# Patient Record
Sex: MALE | Race: WHITE | Employment: FULL TIME | URBAN - METROPOLITAN AREA
[De-identification: names, ages, dates, MRNs, and addresses within clinical notes are randomized per-mention and may not be internally consistent; named-entity substitution may affect disease eponyms.]

---

## 2018-01-03 ENCOUNTER — OFFICE VISIT (OUTPATIENT)
Dept: NEUROLOGY | Age: 63
End: 2018-01-03

## 2018-01-03 DIAGNOSIS — M54.12 CERVICAL RADICULOPATHY: Primary | ICD-10-CM

## 2018-01-03 DIAGNOSIS — G56.10 MEDIAN NERVE NEUROPATHY, UNSPECIFIED LATERALITY: ICD-10-CM

## 2018-01-03 NOTE — PROGRESS NOTES
EMG/ NCS Report  hospitals, [address]  Ph: [phone]/ [phone]  FAX: [phone]/ [phone]  Test Date:  1/3/2018    Patient: Timmy Oviedo : 1955 Physician: Jessica France MD   Sex: Male Height: ' \" Ref Phys: Ad    ID#: [id number] Weight:  lbs. Technician: Shyanne Hugo     Patient History / Exam:  CC:CIELO UPPERS,4TH AND 5 TH DIGITS DRAWING,SHOULDER PAIN AND MUSLCE SPASM. EMG & NCV Findings:  Evaluation of the left median motor and the right median motor nerves showed normal distal onset latency (L3.8, R3.8 ms), normal amplitude (L10.9, R8.4 mV), and normal conduction velocity (Elbow-Wrist, L52, R55 m/s). The left ulnar motor nerve showed normal distal onset latency (3.0 ms), reduced amplitude (6.2 mV), decreased conduction velocity (B Elbow-Wrist, 44 m/s), and normal conduction velocity (A Elbow-B Elbow, 71 m/s). The right ulnar motor nerve showed normal distal onset latency (2.8 ms), normal amplitude (7.0 mV), normal conduction velocity (B Elbow-Wrist, 58 m/s), and normal conduction velocity (A Elbow-B Elbow, 63 m/s). The left median sensory, the left radial sensory, and the left ulnar sensory nerves showed normal distal peak latency (L3.3, L2.0, L3.6 ms) and normal amplitude (L21.2, L30.9, L9.0 µV). The right median sensory and the right ulnar sensory nerves showed normal distal peak latency (R3.1, R2.9 ms) and reduced amplitude (R8.5, R8.3 µV). The right radial sensory nerve showed no response (Wrist), no response (Site 2), and no response (Site 3). Left vs. Right side comparison data for the ulnar sensory nerve indicates abnormal L-R latency difference (0.7 ms). All remaining left vs. right side differences were within normal limits. All F Wave latencies were within normal limits. All F Wave left vs. right side latency differences were within normal limits.       Needle evaluation of the right flexor carpi ulnaris, the right triceps, the right biceps, the right mid cervical paraspinal, the right Lower Cerv Parasp, the left mid cervical paraspinal, the left Lower Cerv Parasp, the left first dorsal interosseous, and the left triceps muscles showed increased insertional activity. All remaining muscles (as indicated in the following table) showed no evidence of electrical instability.         Impression:        ___________________________  Ivonne Espinoza IV, MD      Nerve Conduction Studies  Anti Sensory Summary Table     Stim Site NR Peak (ms) Norm Peak (ms) P-T Amp (µV) Norm P-T Amp Site1 Site2 Dist (cm)   Left Median Anti Sensory (2nd Digit)  31.4°C   Wrist    3.3 <4 21.2 >13 Wrist 2nd Digit 14.0   Elbow    3.3  22.1  Elbow Wrist 0.0   Right Median Anti Sensory (2nd Digit)  30.8°C   Wrist    3.1 <4 8.5 >13 Wrist 2nd Digit 14.0   Elbow    3.1  6.0  Elbow Wrist 0.0   Axilla    3.1  7.3  Axilla Elbow 0.0   Left Radial Anti Sensory (Base 1st Digit)  32.7°C   Wrist    2.0 <2.8 30.9 >11 Wrist Base 1st Digit 10.0   Site 2    2.0  26.6       Right Radial Anti Sensory (Base 1st Digit)  31.9°C   Wrist NR  <2.8  >11 Wrist Base 1st Digit 10.0   Site 2 NR          Site 3 NR          Left Ulnar Anti Sensory (5th Digit)  31.9°C   Wrist    3.6 <4.0 9.0 >9 Wrist 5th Digit 14.0   B Elbow    3.5  5.1  B Elbow Wrist 0.0   A Elbow    3.3  7.2  A Elbow B Elbow 0.0   Right Ulnar Anti Sensory (5th Digit)  31.5°C   Wrist    2.9 <4.0 8.3 >9 Wrist 5th Digit 14.0   B Elbow    3.0  5.9  B Elbow Wrist 0.0     Motor Summary Table     Stim Site NR Onset (ms) Norm Onset (ms) O-P Amp (mV) Norm O-P Amp Amp (Prev) (%) Site1 Site2 Dist (cm) Sandro (m/s) Norm Sandro (m/s)   Left Median Motor (Abd Poll Brev)  33°C   Wrist    3.8 <4.5 10.9 >4.1 100.0 Wrist Abd Poll Brev 8.0     Elbow    8.0  9.7  89.0 Elbow Wrist 22.0 52 >49   Right Median Motor (Abd Poll Brev)  32.2°C   Wrist    3.8 <4.5 8.4 >4.1 100.0 Wrist Abd Poll Brev 8.0     Elbow    8.0  7.3  86.9 Elbow Wrist 23.0 55 >49   Left Ulnar Motor (Abd Dig Minimi)  31.3°C   Wrist    3.0 <3.1 6.2 >7.0 100.0 Wrist Abd Dig Minimi 8.0  >50   B Elbow    8.2  2.6  41.9 B Elbow Wrist 23.0 44 >50   A Elbow    9.6  2.5  96.2 A Elbow B Elbow 10.0 71 >50   Right Ulnar Motor (Abd Dig Minimi)  32.6°C   Wrist    2.8 <3.1 7.0 >7.0 100.0 Wrist Abd Dig Minimi 8.0  >50   B Elbow    6.6  7.0  100.0 B Elbow Wrist 22.0 58 >50   A Elbow    8.2  6.9  98.6 A Elbow B Elbow 10.0 63 >50     F Wave Studies     NR F-Lat (ms) Lat Norm (ms) L-R F-Lat (ms) L-R Lat Norm   Left Ulnar (Mrkrs) (Abd Dig Min)  31.1°C      27.92 <32 0.71 <2.5   Right Ulnar (Mrkrs) (Abd Dig Min)  32.7°C      27.21 <32 0.71 <2.5     EMG     Side Muscle Nerve Root Ins Act Fibs Psw Recrt Duration Amp Poly Comment   Right 1stDorInt Ulnar C8-T1 Nml Nml Nml Nml Nml Nml Nml    Right FlexCarpiUln Ulnar C8,T1 Incr Nml Nml Nml Nml Nml Nml    Right BrachioRad Radial C5-6 Nml Nml Nml Nml Nml Nml Nml    Right Triceps Radial C6-7-8 Incr Nml Nml Nml Nml Nml Nml    Right Biceps Musculocut C5-6 Incr Nml Nml Nml Nml Nml Nml    Right Deltoid Axillary C5-6 Nml Nml Nml Nml Nml Nml Nml    Right Mid Cerv Parasp Rami C5,6 Incr Nml Nml Nml Nml Nml Nml    Right Lower Cerv Parasp Rami C7,T1 Incr Nml Nml Nml Nml Nml Nml    Left Mid Cerv Parasp Rami C5,6 Incr Nml Nml Nml Nml Nml Nml    Left Lower Cerv Parasp Rami C7,T1 Incr Nml Nml Nml Nml Nml Nml    Left 1stDorInt Ulnar C8-T1 Incr Nml Nml Nml Nml Nml Nml    Left FlexCarpiUln Ulnar C8,T1 Nml Nml Nml Nml Nml Nml Nml    Left BrachioRad Radial C5-6 Nml Nml Nml Nml Nml Nml Nml    Left Triceps Radial C6-7-8 Incr Nml Nml Nml Nml Nml Nml    Left Deltoid Axillary C5-6 Nml Nml Nml Nml Nml Nml Nml                Nerve Conduction Studies  Anti Sensory Left/Right Comparison     Stim Site L Lat (ms) R Lat (ms) L-R Lat (ms) L Amp (µV) R Amp (µV) L-R Amp (%) Site1 Site2 L Sandro (m/s) R Sandro (m/s) L-R Sandro (m/s)   Median Anti Sensory (2nd Digit)  31.4°C   Wrist 2.5 2.4 0.1 21.2 8.5 59.9 Wrist 2nd Digit 56 58 2   Elbow 2.8 2.6 0.2 22.1 6.0 72.9 Elbow Wrist      Radial Anti Sensory (Base 1st Digit)  32.7°C   Wrist 1.6   30.9   Wrist Base 1st Digit 63     Site 2 1.5   26.6          Ulnar Anti Sensory (5th Digit)  31.9°C   Wrist 2.8 2.1 0.7 9.0 8.3 7.8 Wrist 5th Digit 50 67 17   B Elbow 2.9 2.4 0.5 5.1 5.9 13.6 B Elbow Wrist      A Elbow 2.9   7.2   A Elbow B Elbow        Motor Left/Right Comparison     Stim Site L Lat (ms) R Lat (ms) L-R Lat (ms) L Amp (mV) R Amp (mV) L-R Amp (%) Site1 Site2 L Sandro (m/s) R Sandro (m/s) L-R Sandro (m/s)   Median Motor (Abd Poll Brev)  33°C   Wrist 3.8 3.8 0.0 10.9 8.4 22.9 Wrist Abd Poll Brev      Elbow 8.0 8.0 0.0 9.7 7.3 24.7 Elbow Wrist 52 55 3   Ulnar Motor (Abd Dig Minimi)  31.3°C   Wrist 3.0 2.8 0.2 6.2 7.0 11.4 Wrist Abd Dig Minimi      B Elbow 8.2 6.6 1.6 2.6 7.0 62.9 B Elbow Wrist 44 58 14   A Elbow 9.6 8.2 1.4 2.5 6.9 63.8 A Elbow B Elbow 71 63 8         Waveforms:

## 2018-01-03 NOTE — PROGRESS NOTES
EMG and nerve conduction of both upper extremities based on patient's complaint of right greater than left-sided weakness and curtailed function of the hands etc.  Previous history of neck surgery and I believe carpal tunnel decompression is in the background as well and s/p L ulnar nerve transposition? Findings:    1. The EMG portion involved: Sampling T1 through C5 muscle groups of both upper extremities. It also involved evaluation of the associated cervical paraspinal levels as well. The insertional activity showed increased irritability in the right flexor carpi ulnaris and the biceps and less so in the triceps groups. No outright declaration of denervation such as fibs positive waves or fasciculations. The right paraspinal groups were also extremely irritable in the mid to lower cervical levels. Motor unit recruitment was good and did not demonstrate any abnormality as it would apply to the number configuration or intervals with such testing. The left side showed some irritability in the left first dorsal interosseous and the flexor carpi ulnaris but less impressive than on the right side. The associated left cervical paraspinals in the mid and lower levels showed irritability as well although again not as marked as on the right side. Again on the left side no outright capture of denervation was otherwise noteworthy. Patient tolerated the procedure well and cooperation was unquestionably excellent. 2.  The nerve conduction portion showed absent right radial sensory response and the left ulnar motor at the elbow showed a pathologic discrepancy in the above versus below nerve conduction comparison. Impression: This study is noteworthy for the previously implied isolated irritability in the right greater than left muscle groups including paraspinals of the C6-8 nerves. This is nonspecific but could indicate early radicular type features at this time. But no active denervation captured.   Also the patient has an implied right radial sensory neuropathy and pathologic discrepancy in the above and below conduction velocities of the left ulnar motor nerve- status post transposition. Clinical correlation is advised.   SARAH GARCIA.

## 2018-03-06 ENCOUNTER — HOSPITAL ENCOUNTER (OUTPATIENT)
Dept: MRI IMAGING | Age: 63
Discharge: HOME | End: 2018-03-06
Payer: COMMERCIAL

## 2018-03-06 DIAGNOSIS — M54.12 RADICULITIS, CERVICAL: ICD-10-CM

## 2018-03-06 PROCEDURE — 72141 MRI NECK SPINE W/O DYE: CPT

## 2019-04-22 ENCOUNTER — HOSPITAL ENCOUNTER (EMERGENCY)
Age: 64
Discharge: HOME | End: 2019-04-22
Attending: EMERGENCY MEDICINE
Payer: COMMERCIAL

## 2019-04-22 ENCOUNTER — APPOINTMENT (OUTPATIENT)
Dept: VASCULAR SURGERY | Age: 64
End: 2019-04-22
Attending: EMERGENCY MEDICINE
Payer: COMMERCIAL

## 2019-04-22 VITALS
SYSTOLIC BLOOD PRESSURE: 174 MMHG | WEIGHT: 165 LBS | BODY MASS INDEX: 25.9 KG/M2 | DIASTOLIC BLOOD PRESSURE: 79 MMHG | OXYGEN SATURATION: 97 % | TEMPERATURE: 98.3 F | HEIGHT: 67 IN | HEART RATE: 94 BPM | RESPIRATION RATE: 18 BRPM

## 2019-04-22 DIAGNOSIS — T14.8XXA MUSCLE STRAIN: Primary | ICD-10-CM

## 2019-04-22 PROCEDURE — 99283 EMERGENCY DEPT VISIT LOW MDM: CPT

## 2019-04-22 PROCEDURE — 93971 EXTREMITY STUDY: CPT

## 2019-04-22 PROCEDURE — 74011250637 HC RX REV CODE- 250/637: Performed by: EMERGENCY MEDICINE

## 2019-04-22 RX ORDER — IBUPROFEN 600 MG/1
600 TABLET ORAL
Qty: 30 TAB | Refills: 0 | Status: SHIPPED | OUTPATIENT
Start: 2019-04-22

## 2019-04-22 RX ORDER — IBUPROFEN 600 MG/1
600 TABLET ORAL
Status: COMPLETED | OUTPATIENT
Start: 2019-04-22 | End: 2019-04-22

## 2019-04-22 RX ADMIN — IBUPROFEN 600 MG: 600 TABLET, FILM COATED ORAL at 16:59

## 2019-04-22 NOTE — ED PROVIDER NOTES
58 y.o. male with past medical history significant for insomnia, PTSD, arthritis, and chronic back pain who presents from [facility] NP office with chief complaint of leg pain. Patient states that he has been experiencing lower right leg tenderness for the past 2 days. He reports that his pain worsened yesterday evening. He notes that his pain woke him up yesterday night and \"felt like pins. \"  Patient visited a [facility] NP earlier today for his symptoms and was advised to visit the ED for possible blood clot. Patient also contacted his PCP, Dr. Abebe Zhou, and was also advised to visit the ED to have an \"ultrasound\" performed. He arrives to the ED with his right lower leg wrapped. Of note, patient also complains of mild back pain and states that he has been doing yard work recently. He denies CP, SOB, numbness, tingling, recent surgeries or hospitalizations, and past h/o blood clots. There are no other acute medical concerns at this time. Social hx: negative tobacco use; positive alcohol use; negative drug use PCP: Dylon Merchant MD 
 
Note written by Naye Leyva, as dictated by Rosenda Luna MD 4:41 PM 
 
 
The history is provided by the patient. No  was used. Past Medical History:  
Diagnosis Date  Arthritis   
 osteo  Chronic back pain  Chronic pain   
 back  Insomnia  Psychiatric disorder   
 hx PTSD  PTSD (post-traumatic stress disorder) Past Surgical History:  
Procedure Laterality Date  HX BACK SURGERY  2015  HX KNEE ARTHROSCOPY Right X 3  
 HX ORTHOPAEDIC Left   
 ankle  HX ORTHOPAEDIC Right   
 arm  HX ORTHOPAEDIC Right 11/23/2016 Carpal tunnel release No family history on file. Social History Socioeconomic History  Marital status: SINGLE Spouse name: Not on file  Number of children: Not on file  Years of education: Not on file  Highest education level: Not on file Occupational History  Not on file Social Needs  Financial resource strain: Not on file  Food insecurity:  
  Worry: Not on file Inability: Not on file  Transportation needs:  
  Medical: Not on file Non-medical: Not on file Tobacco Use  Smoking status: Never Smoker  Smokeless tobacco: Never Used Substance and Sexual Activity  Alcohol use: Yes Alcohol/week: 8.4 oz Types: 7 Cans of beer, 7 Shots of liquor per week Comment: either 2 beers or 2 mixed drinks nightly  Drug use: No  
 Sexual activity: Never Lifestyle  Physical activity:  
  Days per week: Not on file Minutes per session: Not on file  Stress: Not on file Relationships  Social connections:  
  Talks on phone: Not on file Gets together: Not on file Attends Taoism service: Not on file Active member of club or organization: Not on file Attends meetings of clubs or organizations: Not on file Relationship status: Not on file  Intimate partner violence:  
  Fear of current or ex partner: Not on file Emotionally abused: Not on file Physically abused: Not on file Forced sexual activity: Not on file Other Topics Concern  Not on file Social History Narrative  Not on file ALLERGIES: Patient has no known allergies. Review of Systems Constitutional: Negative for chills and fever. HENT: Negative for ear pain and sore throat. Eyes: Negative for pain. Respiratory: Negative for chest tightness and shortness of breath. Cardiovascular: Negative for chest pain and leg swelling. Gastrointestinal: Negative for abdominal pain, nausea and vomiting. Genitourinary: Negative for dysuria and flank pain. Musculoskeletal: Positive for back pain (mild) and myalgias (right leg pain). Skin: Negative for rash. Neurological: Negative for headaches. All other systems reviewed and are negative. Vitals:  
 04/22/19 1643 BP: 174/79 Pulse: 94 Resp: 18 Temp: 98.3 °F (36.8 °C) SpO2: 97% Weight: 74.8 kg (165 lb) Height: 5' 7\" (1.702 m) Physical Exam  
Constitutional: No distress. HENT:  
Head: Normocephalic and atraumatic. Eyes: Pupils are equal, round, and reactive to light. Conjunctivae are normal. No scleral icterus. Neck: No tracheal deviation present. Cardiovascular: Normal rate and regular rhythm. Pulmonary/Chest: Effort normal and breath sounds normal. No stridor. No respiratory distress. Abdominal: Soft. He exhibits no distension. There is no tenderness. Musculoskeletal: He exhibits no edema or deformity. Right lower leg: He exhibits tenderness (tenderness over right shin). Neurological: He is alert. Skin: Skin is warm and dry. Psychiatric: He has a normal mood and affect. Nursing note and vitals reviewed. Note written by Naye Fierro, as dictated by Haylee Harvey MD 4:41 PM 
 
 
MDM Number of Diagnoses or Management Options Muscle strain:  
Diagnosis management comments: 59 yo male with right LE pain. - ultrasound neg - ibuprofen 
- f/u c pcp 
- given return precautions Procedures Sarahi Elliott.  Stuart Espinoza MD

## 2019-04-22 NOTE — ED TRIAGE NOTES
Pt presents with right lower leg tenderness, pt referred to rule out DVT. Pt with calf tenderness on Dr. Deloras Halsted assessment. Pt denies any chest pain or shortness of breath. Reports some back pain.

## 2019-04-22 NOTE — ED NOTES
Discharge instructions given to patient. Understanding verbalized. All questions answered. Discharged home with self.

## 2019-06-14 ENCOUNTER — OFFICE VISIT (OUTPATIENT)
Dept: NEUROLOGY | Age: 64
End: 2019-06-14

## 2019-06-14 VITALS
WEIGHT: 162.5 LBS | SYSTOLIC BLOOD PRESSURE: 130 MMHG | RESPIRATION RATE: 18 BRPM | TEMPERATURE: 98.2 F | OXYGEN SATURATION: 96 % | DIASTOLIC BLOOD PRESSURE: 88 MMHG | HEIGHT: 67 IN | BODY MASS INDEX: 25.51 KG/M2 | HEART RATE: 84 BPM

## 2019-06-14 DIAGNOSIS — M62.81 MUSCLE WEAKNESS: ICD-10-CM

## 2019-06-14 DIAGNOSIS — R20.8 SKIN PAIN: ICD-10-CM

## 2019-06-14 DIAGNOSIS — M79.604 RIGHT LEG PAIN: ICD-10-CM

## 2019-06-14 DIAGNOSIS — M79.604 RIGHT LEG PAIN: Primary | ICD-10-CM

## 2019-06-14 RX ORDER — LIDOCAINE 50 MG/G
3 PATCH TOPICAL EVERY 24 HOURS
Qty: 90 EACH | Refills: 3 | Status: SHIPPED | OUTPATIENT
Start: 2019-06-14

## 2019-06-14 RX ORDER — ACYCLOVIR 400 MG/1
400 TABLET ORAL 2 TIMES DAILY
COMMUNITY

## 2019-06-14 NOTE — PROGRESS NOTES
[facility] and [address] at [facility] at [address], [address] [place] [address] [address] [facility], [address] [place] [address]  [phone] Office  [phone] Facsimile           Referring: Jair Leon MD      Chief Complaint   Patient presents with    New Patient    Leg Pain     right over the past 8 wks, went to [facility] ED to r/o clot, then followed with pcp who referred to vascular (seen last Fri) no clot or vascular prop found.  having anything rubbing against leg or using leg to press accelerator is very painful      41-year-old right-handed man who comes today for evaluation of what he calls pain in his right leg. He says symptoms started about 8 weeks ago. He says that he has had ultrasounds time to looking for blood clots and has been fine. He says that now he has pain if he touches the right part of the shin. He actually describes from the shin down to the ankle hypersensitivity. He says that even his clothing touching it causes a remarkable degree of pain. He says that his boss has allowed him to wear shorts to work because his pants leg touching his skin caused him so much discomfort. He says that at work he is gotten to the point that he does not drink any coffee and he will eat anything because he does not want to get up and go to the restroom because walking causes him intense pain. It is from again the shin to the ankle on the right. The left is fine. No injury. He did note that last year he had the shingles vaccine and then he had a disseminated rash but nothing in that front of the leg. He is not had any rash recently there. No bites or other known etiology. He has not had any focal weakness. No chest pain. No fevers or chills.       Past Medical History:   Diagnosis Date    Arthritis     osteo    Chronic back pain     Chronic pain     back    Insomnia     Psychiatric disorder     hx PTSD    PTSD (post-traumatic stress disorder)        Past Surgical History:   Procedure Laterality Date    HX BACK SURGERY  2015    HX CATARACT REMOVAL Bilateral     HX KNEE ARTHROSCOPY Right     X 3    HX ORTHOPAEDIC Left     ankle    HX ORTHOPAEDIC Right     arm    HX ORTHOPAEDIC Right 11/23/2016    Carpal tunnel release       Current Outpatient Medications   Medication Sig Dispense Refill    acyclovir (ZOVIRAX) 400 mg tablet Take 400 mg by mouth two (2) times a day.  ibuprofen (MOTRIN) 600 mg tablet Take 1 Tab by mouth every six (6) hours as needed for Pain. 30 Tab 0    temazepam (RESTORIL) 15 mg capsule Take 15 mg by mouth nightly as needed for Sleep.  ergocalciferol, vitamin d2, 400 unit tab Take 1 Tab by mouth daily.  gabapentin (NEURONTIN) 100 mg capsule Take 200 mg by mouth four (4) times daily. Indications: Extreme Discomfort in Calves when Sitting or Lying Down      vitamin E (AQUA GEMS) 400 unit capsule Take  by mouth daily.  omega-3 fatty acids-vitamin e (FISH OIL) 1,000 mg cap Take 1 Cap by mouth daily.  multivitamin (ONE A DAY) tablet Take 1 Tab by mouth daily.  ketoconazole (NIZORAL) 2 % topical cream Apply  to affected area daily.  travoprost (TRAVATAN Z) 0.004 % ophthalmic solution Administer 1 Drop to both eyes every evening. Indications: prevention of glucoma      clobetasol (TEMOVATE) 0.05 % topical cream Apply  to affected area two (2) times a day.  traZODone (DESYREL) 50 mg tablet Take 100 mg by mouth nightly. Indications: sleep          No Known Allergies    Social History     Tobacco Use    Smoking status: Never Smoker    Smokeless tobacco: Never Used   Substance Use Topics    Alcohol use: Yes     Alcohol/week: 8.4 oz     Types: 7 Cans of beer, 7 Shots of liquor per week     Comment: either 2 beers or 2 mixed drinks nightly    Drug use: No       History reviewed. No pertinent family history.     Review of Systems  Pertinent positives and negatives as noted with remainder of comprehensive review negative      Examination  Visit Vitals  /88 (BP 1 Location: Left arm, BP Patient Position: Sitting)   Pulse 84   Temp 98.2 °F (36.8 °C) (Oral)   Resp 18   Ht 5' 7\" (1.702 m)   Wt 73.7 kg (162 lb 8 oz)   SpO2 96%   BMI 25.45 kg/m²     Pleasant, well appearing. Dress and grooming are appropriate. No scleral icterus is present. Oropharynx is clear. Supple neck without bruit appreciated. Heart regular. Pulses are symmetrical.  No edema in the lower extremities. Neurologically, he is awake, alert, oriented with normal speech, language, and cognition. Visual fields are full to direct confrontational testing. He has sharp disk margins bilaterally. Pupils react bilaterally. He has full versions without nystagmus. Face is symmetrical with symmetrical facial sensation. Tongue and palate are midline. Shoulder shrug is symmetrical. Hearing is intact to conversation. He has normal bulk and tone. There is no abnormal movement. There is no pronation or drift. He is able to generate full strength in the upper and lower extremities and all muscle groups to direct confrontational testing. Reflexes are symmetrical in the upper and lower extremities bilaterally. His toes are down going. There is no Torres. There is no finger flexor reflex bilaterally. Finger nose finger and rapid alternating movements are normal. He has no ataxia or past pointing. Sensory examination is intact to primary modalities and there is no lateralization of sensation but he is extremely hypersensitive to touch in the region described above the anterior lower leg from the knee down to the ankle. [facility] is able to ambulate without difficulty. Impression/Plan  Hyperesthesia in the right lower extremity. Question etiology. We will get an EMG to evaluate.   I did give him some Lidoderm patches to see if that would help make him more comfortable. Follow-up after EMG    Talya Priest MD      This note was created using voice recognition software. Despite editing, there may be syntax errors. This note will not be viewable in [address]

## 2019-06-14 NOTE — PROGRESS NOTES
Chief Complaint   Patient presents with    New Patient    Leg Pain     right over the past 8 wks, went to [facility] ED to r/o clot, then followed with pcp who referred to vascular (seen last Fri) no clot or vascular prop found.  having anything rubbing against leg or using leg to press accelerator is very painful      rec'd Shingrix vaccine last yr and broke out in rash which is why he started acyclovir.

## 2019-06-14 NOTE — PATIENT INSTRUCTIONS
If you choose to go to imaging center outside of [place] [address], please be sure to bring imaging report and disc to follow up appointment. If we have ordered testing for you, know that; \"NO NEWS IS GOOD NEWS! \" It is our policy that we know longer call patients with results, nor do we  give test results over the phone. We schedule follow up appointments so that your results can be discussed in person. This allows you to address any questions you have regarding the results. If something of concern is revealed on your test, we will contact you to discuss the matter and if needed schedule a sooner follow up appointment. Additionally, results may be found by using the My Chart feature and one of our patient service representatives at the  can give you instructions on how to access this feature to utilize our electronic medical record system. Thank you for your understanding.

## 2019-06-17 ENCOUNTER — OFFICE VISIT (OUTPATIENT)
Dept: NEUROLOGY | Age: 64
End: 2019-06-17

## 2019-06-17 ENCOUNTER — TELEPHONE (OUTPATIENT)
Dept: NEUROLOGY | Age: 64
End: 2019-06-17

## 2019-06-17 DIAGNOSIS — M79.604 RIGHT LEG PAIN: Primary | ICD-10-CM

## 2019-06-25 ENCOUNTER — TELEPHONE (OUTPATIENT)
Dept: NEUROLOGY | Age: 64
End: 2019-06-25

## 2019-06-25 NOTE — PROCEDURES
EMG/ NCS Report  [facility]  - [facility]  [facility]  [facility], [address] [place], [address]   Ph: [phone]   FAX: [phone]/ [phone]  Test Date:  2019    Patient: Ever Malcolm : 1955 Physician: Alexander Rodriguez M.D. Sex: Male Height: ' \" Ref Phys: Shmuel Mclaughlin MD   ID#: [id number] Weight:  lbs. Technician: Gala Bowden     Patient History:  CC. pain, allodynia/ paresthesia, involving medial aspect of right leg from knee to ankle. EMG & NCV Findings:  Evaluation of the right Fibular motor nerve showed normal distal onset latency (3.4 ms), normal amplitude (5.6 mV), normal conduction velocity (B Fib-Ankle, 44 m/s), and normal conduction velocity (Poplt-B Fib, 77 m/s). The right tibial motor nerve showed normal distal onset latency (3.3 ms), normal amplitude (8.3 mV), and normal conduction velocity (Knee-Ankle, 43 m/s). The left saphenous sensory nerve showed normal distal peak latency (3.7 ms) and reduced amplitude (3.9 µV). The right Sup Fibular sensory nerve showed normal distal peak latency (3.1 ms), normal amplitude (10.6 µV), and normal conduction velocity (Lower leg-Lat ankle, 42 m/s). The right sural sensory nerve showed normal distal peak latency (3.6 ms) and normal amplitude (9.2 µV). All F Wave latencies were within normal limits. Needle evaluation of the right posterior tibialis and the right vastus medialis muscles showed diminished recruitment. The right medial gastrocnemius muscle showed slightly increased spontaneous activity. All remaining muscles (as indicated in the following table) showed no evidence of electrical instability. Summary:  Symmetric femoral motor responses. Significantly reduced amplitude of right saphenous sensory response compared to left saphenous sensory response. Normal right sural and superficial peroneal sensory response.        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 No electrodiagnostic evidence of peripheral neuropathy.      ___________________________  Celina Morfin M.D.        Nerve Conduction Studies  Anti Sensory Summary Table     Stim Site NR Peak (ms) Norm Peak (ms) P-T Amp (µV) Norm P-T Amp Site1 Site2 Dist (cm)   Left Saphenous Anti Sensory (Medial Ankle)  22.8°C   Medial Calf    3.7 <4.4 3.9 >4 Medial Calf Medial Ankle 0.0   Site 2    3.2  6.4       Site 3    2.8  5.0       Site 4    2.9  5.3       Right Saphenous Anti Sensory (Medial Ankle)  22.4°C   Site 10    8.7  32.0       Right Sup Fibular Anti Sensory (Lat ankle)  31.5°C   Lower leg    3.1 <4.6 10.6 >4 Lower leg Lat ankle 10.0   Site 2    2.9  15.0       Right Sural Anti Sensory (Lat Mall)  32.1°C   Calf    3.6 <4.5 9.2 >4.0 Calf Lat Mall 14.0   Site 2    3.6  9.1         Motor Summary Table     Stim Site NR Onset (ms) Norm Onset (ms) O-P Amp (mV) Norm O-P Amp Amp (Prev) (%) Site1 Site2 Dist (cm) Sandro (m/s) Norm Sandro (m/s)   Left Femoral Motor (Vastus Med)  23.5°C   Abv Ing Lig    2.4  0.8  100.0        Right Femoral Motor (Vastus Med)  23°C   Abv Ing Lig    2.0  0.8  100.0        Right Fibular Motor (Ext Dig Brev)  31.2°C   Ankle    3.4 <6.5 5.6 >1.1 100.0 Ankle Ext Dig Brev 8.0     B Fib    10.6  4.8  85.7 B Fib Ankle 32.0 44 >38   Poplt    11.9  4.6  95.8 Poplt B Fib 10.0 77 >42   Right Tibial Motor (Abd Anthony Brev)  31.2°C   Ankle    3.3 <6.1 8.3 >1.1 100.0 Ankle Abd Anthony Brev 8.0     Knee    11.7  7.7  92.8 Knee Ankle 36.0 43 >39     F Wave Studies     NR F-Lat (ms) Lat Norm (ms) L-R F-Lat (ms) L-R Lat Norm   Right Tibial (Mrkrs) (Abd Hallucis)  31.2°C      40.00 <56  <5.7     H Reflex Studies     NR H-Lat (ms) L-R H-Lat (ms) L-R Lat Norm   Right Tibial (Gastroc)  31.1°C      30.82  <2.0     EMG     Side Muscle Nerve Root Ins Act Fibs Psw Recrt Duration Amp Poly Comment   Right PostTibialis Tibial L5, S1 Nml Nml Nml Reduced Nml Nml Nml pain limited   Right MedGastroc Tibial S1-2 Nml Nml 1+ Nml Nml Nml Nml    Right AntTibialis Dp Br Peron L4-5 Nml Nml Nml Nml Nml Nml Nml    Right VastusMed Femoral L2-4 Nml Nml Nml Reduced Nml Nml Nml pain limited   Right GluteusMax InfGluteal L5-S2 Nml Nml Nml Nml Nml Nml Nml    Right GluteusMed SupGluteal L4-S1 Nml Nml Nml Nml Nml Nml Nml    Right Mid Lumb Parasp Rami L4,5 Nml Nml Nml Nml Nml Nml Nml    Right Lower Lumb Parasp Rami L5,S1 Nml Nml Nml Nml Nml Nml Nml    Right AdductorLong Obturator L2-4 Nml Nml Nml Nml Nml Nml Nml      Waveforms:

## 2019-06-25 NOTE — TELEPHONE ENCOUNTER
Rosanna from Dr Huber [facility]ella office calling to request office visit notes   Office # [phone]  Fax # [phone]

## 2025-06-19 NOTE — TELEPHONE ENCOUNTER
Called pt and r/s to Monday, June 17, 2019 02:00 PM as we had cxl today
Pt was told if there was a cancellation for an EMG he would be called.  He works close by and can be here as soon as possible  Best # [phone]
Male